# Patient Record
Sex: FEMALE | Race: WHITE | NOT HISPANIC OR LATINO | Employment: OTHER | URBAN - METROPOLITAN AREA
[De-identification: names, ages, dates, MRNs, and addresses within clinical notes are randomized per-mention and may not be internally consistent; named-entity substitution may affect disease eponyms.]

---

## 2020-02-25 LAB
EXT 24 HR UR METANEPHRINE: NORMAL
EXT 24 HR UR NORMETANEPHRINE: NORMAL
EXT 24 HR UR NORMETANEPHRINE: NORMAL
EXT 25 HYDROXY VIT D2: NORMAL
EXT 25 HYDROXY VIT D3: NORMAL
EXT 5 HIAA 24 HR URINE: NORMAL
EXT 5 HIAA BLOOD: NORMAL
EXT ACTH: NORMAL
EXT AFP: NORMAL
EXT ALBUMIN: 4.6 G/DL
EXT ALKALINE PHOSPHATASE: 62 U/L
EXT ALT: 16 U/L
EXT AMYLASE: NORMAL
EXT ANTI ISLET CELL AB: NORMAL
EXT ANTI PARIETAL CELL AB: NORMAL
EXT ANTI THYROID AB: NORMAL
EXT AST: 29 U/L
EXT BILIRUBIN DIRECT: NORMAL
EXT BILIRUBIN TOTAL: 0.4 MG/DL
EXT BK VIRUS DNA QN PCR: NORMAL
EXT BUN: 23 MG/DL
EXT C PEPTIDE: NORMAL
EXT CA 125: NORMAL
EXT CA 19-9: NORMAL
EXT CA 27-29: NORMAL
EXT CALCITONIN: NORMAL
EXT CALCIUM: 9.8 MG/DL
EXT CEA: NORMAL
EXT CHLORIDE: 103 MMOL/L
EXT CHOLESTEROL: NORMAL
EXT CHROMOGRANIN A: NORMAL
EXT CO2: 28 MMOL/L
EXT CREATININE UA: NORMAL
EXT CREATININE: 0.6 MG/DL
EXT CYCLOSPORONE LEVEL: NORMAL
EXT DOPAMINE: NORMAL
EXT EBV DNA BY PCR: NORMAL
EXT EPINEPHRINE: NORMAL
EXT FOLATE: NORMAL
EXT FREE T3: NORMAL
EXT FREE T4: NORMAL
EXT FSH: NORMAL
EXT GASTRIN RELEASING PEPTIDE: NORMAL
EXT GASTRIN RELEASING PEPTIDE: NORMAL
EXT GASTRIN: NORMAL
EXT GGT: NORMAL
EXT GHRELIN: NORMAL
EXT GLUCAGON: NORMAL
EXT GLUCOSE: 127 MG/DL
EXT GROWTH HORMONE: NORMAL
EXT HCV RNA QUANT PCR: NORMAL
EXT HDL: NORMAL
EXT HEMATOCRIT: 36 %
EXT HEMOGLOBIN A1C: NORMAL
EXT HEMOGLOBIN: 11.9 G/DL
EXT HISTAMINE 24 HR URINE: NORMAL
EXT HISTAMINE: NORMAL
EXT IGF-1: NORMAL
EXT IMMUNKNOW (NON-STIMULATED): NORMAL
EXT IMMUNKNOW (STIMULATED): NORMAL
EXT INR: NORMAL
EXT INSULIN: NORMAL
EXT LANREOTIDE LEVEL: NORMAL
EXT LDH, TOTAL: 378 U/L
EXT LDL CHOLESTEROL: NORMAL
EXT LIPASE: NORMAL
EXT MAGNESIUM: NORMAL
EXT METANEPHRINE FREE PLASMA: NORMAL
EXT MOTILIN: NORMAL
EXT NEUROKININ A CAMB: NORMAL
EXT NEUROKININ A ISI: NORMAL
EXT NEUROTENSIN: NORMAL
EXT NOREPINEPHRINE: NORMAL
EXT NORMETANEPHRINE: NORMAL
EXT NSE: NORMAL
EXT OCTREOTIDE LEVEL: NORMAL
EXT PANCREASTATIN CAMB: NORMAL
EXT PANCREASTATIN ISI: NORMAL
EXT PANCREATIC POLYPEPTIDE: NORMAL
EXT PHOSPHORUS: NORMAL
EXT PLATELETS: 279 X10^3/UL
EXT POTASSIUM: 3.8 MMOL/L
EXT PROGRAF LEVEL: NORMAL
EXT PROLACTIN: NORMAL
EXT PROTEIN TOTAL: 7.9 G/DL
EXT PROTEIN UA: NORMAL
EXT PT: NORMAL
EXT PTH, INTACT: NORMAL
EXT PTT: NORMAL
EXT RAPAMUNE LEVEL: NORMAL
EXT SEROTONIN: NORMAL
EXT SODIUM: 139 MMOL/L
EXT SOMATOSTATIN: NORMAL
EXT SUBSTANCE P: NORMAL
EXT TRIGLYCERIDES: NORMAL
EXT TRYPTASE: NORMAL
EXT TSH: NORMAL
EXT URIC ACID: NORMAL
EXT URINE AMYLASE U/HR: NORMAL
EXT URINE AMYLASE U/L: NORMAL
EXT VASOACTIVE INTESTINAL POLYPEPTIDE: NORMAL
EXT VITAMIN B12: NORMAL
EXT VMA 24 HR URINE: NORMAL
EXT WBC: 5 X10^3/UL
NEURON SPECIFIC ENOLASE: NORMAL

## 2020-03-05 ENCOUNTER — TELEPHONE (OUTPATIENT)
Dept: NEUROLOGY | Facility: HOSPITAL | Age: 45
End: 2020-03-05

## 2020-03-05 DIAGNOSIS — C7A.026 MALIGNANT CARCINOID TUMOR OF RECTUM: Primary | ICD-10-CM

## 2020-03-05 DIAGNOSIS — C7A.1 HIGH GRADE NEUROENDOCRINE CARCINOMA: Primary | ICD-10-CM

## 2020-03-05 NOTE — TELEPHONE ENCOUNTER
Spoke with patient about referral to [facility].  Gerardo made with Dr. Rehman.  No ga 68 dotatae scan needed prior to gerardo, rec FDG PET. per Dr. Rehman. She will bring her CT and MRI to clinic. All questions answered.

## 2020-03-05 NOTE — TELEPHONE ENCOUNTER
----- Message from Aggie Esparza RN sent at 3/5/2020  1:10 PM CST -----  She is getting neither... She is coming next week.  She had a ct and mri for restage post chemo/rads    ----- Message -----  From: oCle Rehman DO, FACP  Sent: 3/5/2020  10:46 AM CST  To: Aggie Esparza RN, Lyla Eid, RN    fdg  ----- Message -----  From: Lyla Eid RN  Sent: 3/5/2020  10:45 AM CST  To: Aggie Esparza RN, Cole Rehman DO, FACP    Ki67 90%, FDG or Ga68?    ----- Message -----  From: Aggie Esparza RN  Sent: 3/5/2020  10:39 AM CST  To: Cole Rehman DO, FACP, #    Do you want a ga scan?    ----- Message -----  From: Cole Rehman DO, FACP  Sent: 3/5/2020  10:35 AM CST  To: Aggie Esparza RN, Lyla Eid, MAVIS    That would be fine    ----- Message -----  From: Aggie Esparza RN  Sent: 3/5/2020  10:00 AM CST  To: Cole Rehman DO, FACP    Hi,  New NET ref with high grade rectal net.  Had chemo and was rescanned.  Looking for an santhosh..... Can you help?  3/11/20?? 11:30?

## 2020-03-05 NOTE — TELEPHONE ENCOUNTER
sched fdg pet scan the day prior to md santhosh. Patient instructed.  She verbalized understanding.

## 2020-03-05 NOTE — TELEPHONE ENCOUNTER
----- Message from Renetta Jaramillo RN sent at 3/4/2020  4:54 PM CST -----  This is an external referral with metastatic neuroendocrine tumor originating in anal canal.    See media tab for information.    Please call and schedule the patient.    Thank you,    Jocelin

## 2020-03-05 NOTE — TELEPHONE ENCOUNTER
----- Message from Ela Araujo sent at 3/5/2020  1:08 PM CST -----  Contact: SELF [phone]  RR - Patient is calling to inquire how long they will have to be here when they come for appointment. Please call

## 2020-03-05 NOTE — TELEPHONE ENCOUNTER
New NET referral from Dr. Eb Riggs in [place], [place].  Patient with neuroendocrine carcinoma arising in the anal canal, diagnosed 8/2019. Pathology revealed a high grade neuroendocrine carcinoma in the soft tissue as well as the anorectal area, Ki 67 > 90%. She received carbo/etoposide - 6 cycles and radiation therapy.

## 2020-03-09 ENCOUNTER — TELEPHONE (OUTPATIENT)
Dept: NEUROLOGY | Facility: HOSPITAL | Age: 45
End: 2020-03-09

## 2020-03-11 ENCOUNTER — OFFICE VISIT (OUTPATIENT)
Dept: NEUROLOGY | Facility: HOSPITAL | Age: 45
End: 2020-03-11
Attending: INTERNAL MEDICINE
Payer: COMMERCIAL

## 2020-03-11 VITALS
HEIGHT: 64 IN | BODY MASS INDEX: 17.93 KG/M2 | WEIGHT: 105.06 LBS | OXYGEN SATURATION: 100 % | DIASTOLIC BLOOD PRESSURE: 81 MMHG | SYSTOLIC BLOOD PRESSURE: 109 MMHG | HEART RATE: 93 BPM

## 2020-03-11 DIAGNOSIS — C7B.8 SECONDARY NEUROENDOCRINE TUMOR OF LIVER: ICD-10-CM

## 2020-03-11 DIAGNOSIS — C7A.1 MALIGNANT POORLY DIFFERENTIATED NEUROENDOCRINE CARCINOMA: ICD-10-CM

## 2020-03-11 PROCEDURE — 99214 OFFICE O/P EST MOD 30 MIN: CPT | Performed by: INTERNAL MEDICINE

## 2020-03-11 PROCEDURE — 99205 PR OFFICE/OUTPT VISIT, NEW, LEVL V, 60-74 MIN: ICD-10-PCS | Mod: ,,, | Performed by: INTERNAL MEDICINE

## 2020-03-11 PROCEDURE — 3008F PR BODY MASS INDEX (BMI) DOCUMENTED: ICD-10-PCS | Mod: CPTII,,, | Performed by: INTERNAL MEDICINE

## 2020-03-11 PROCEDURE — 3008F BODY MASS INDEX DOCD: CPT | Mod: CPTII,,, | Performed by: INTERNAL MEDICINE

## 2020-03-11 PROCEDURE — 99205 OFFICE O/P NEW HI 60 MIN: CPT | Mod: ,,, | Performed by: INTERNAL MEDICINE

## 2020-03-11 RX ORDER — FENTANYL 25 UG/1
PATCH TRANSDERMAL
COMMUNITY
Start: 2020-02-21

## 2020-03-11 RX ORDER — CYCLOBENZAPRINE HCL 10 MG
TABLET ORAL
COMMUNITY

## 2020-03-11 RX ORDER — POTASSIUM CHLORIDE 20 MEQ/1
TABLET, EXTENDED RELEASE ORAL
COMMUNITY

## 2020-03-11 RX ORDER — VIT C/E/ZN/COPPR/LUTEIN/ZEAXAN 250MG-90MG
CAPSULE ORAL
COMMUNITY

## 2020-03-11 RX ORDER — ONDANSETRON 8 MG/1
TABLET, ORALLY DISINTEGRATING ORAL
COMMUNITY
Start: 2020-01-28

## 2020-03-11 RX ORDER — FENTANYL 50 UG/1
PATCH TRANSDERMAL
COMMUNITY

## 2020-03-11 RX ORDER — ALBUTEROL SULFATE 90 UG/1
AEROSOL, METERED RESPIRATORY (INHALATION)
COMMUNITY

## 2020-03-11 RX ORDER — POTASSIUM CHLORIDE 20 MEQ/1
TABLET, EXTENDED RELEASE ORAL
COMMUNITY
Start: 2020-02-20

## 2020-03-11 RX ORDER — OXYCODONE HYDROCHLORIDE 10 MG/1
TABLET ORAL
COMMUNITY
Start: 2020-02-21

## 2020-03-11 NOTE — PROGRESS NOTES
[facility]OLAN[facility]:  [facility]  A collaboration between [facility] and [facility]    PATIENT: Sasha Bales  MRN: [id number]  DATE: 3/11/2020      Diagnosis:   1. Malignant poorly differentiated neuroendocrine carcinoma    2. Secondary neuroendocrine tumor of liver        Chief Complaint: Consult      Oncologic History:      Oncologic History Neuroendocrine carcinoma of the anus diagnosed 07/2019  Metastatic disease to liver at presentation      Oncologic Treatment Palliative pelvic radiation 30 Gy 08/2019  Carboplatin/etoposide 08/2019 - 1/2020 (6 cycles)      Pathology   07/2019:  Anorectal mass-Poorly differentiated squamous cell carcinoma  08/2019:  Anorectal mass-Poorly differentiated High-grade neuroendocrine carcinoma, Ki-67 90%          Subjective:    Interval History: Ms. Bales is a 45 y.o. female who is seen as an initial visit for a neuroendocrine carcinoma the anus.  Her history dates to spring of 2019 when she started to develop rectal pressure and pain and had noted a mass.  She had initially thought that this was uterine prolapse as she was told that she was at high risk for this.  She eventually sought medical attention and a biopsy was performed in 07/2019 with pathology showing a poorly differentiated squamous cell carcinoma.  A CT scan was performed showing metastatic disease to the liver. A Re-read was performed on the initial biopsy, this time showing a poorly differentiated, high-grade neuroendocrine carcinoma with Ki-67 of 90%.  She completed palliative pelvic radiation in 08/2019 and then went on to systemic chemotherapy using carboplatin and etoposide and completed 6 cycles in 01/2020.  Repeat imaging had revealed overall stability of her disease.    She is seen today with her .  She states that her pain has been well controlled and she has been trying to wean down on opioid analgesia.  She is  active and her weight has been stable.  She denies any rectal bleeding. She has no other new complaints.    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    high grade, Ki 67 > 90%    Hx antineoplastic chemotherapy 08/20/2019    Carbo/Etoposide    Hx of radiation therapy 08/05/2019    30 Gy completed 8/16/19    Malignant neuroendocrine tumor of lymph node     Malignant poorly differentiated neuroendocrine carcinoma 3/11/2020    Secondary malignant neuroendocrine tumor of liver     Secondary neuroendocrine tumor of liver 3/11/2020       Past Surgical HIstory:   Past Surgical History:   Procedure Laterality Date    AUGMENTATION OF BREAST Bilateral     BREAST BIOPSY Right     dermoid cyst      back       Family History:   Family History   Problem Relation Age of Onset    Cancer Father         pancreatic     Cancer Maternal Grandmother         ? lung    Cancer Maternal Grandfather        Social History:  reports that she has been smoking. She has a 33.00 pack-year smoking history. She uses smokeless tobacco. She reports that she drinks alcohol. She reports that she has current or past drug history. Drug: Marijuana.    Allergies:  Review of patient's allergies indicates:  No Known Allergies    Medications:  Current Outpatient Medications   Medication Sig Dispense Refill    albuterol (PROAIR HFA) 90 mcg/actuation inhaler ProAir HFA 90 mcg/actuation aerosol inhaler      cholecalciferol, vitamin D3, (VITAMIN D3) 25 mcg (1,000 unit) capsule Vitamin D3 25 mcg (1,000 unit) capsule   Take 1 capsule every day by oral route.      cyclobenzaprine (FLEXERIL) 10 MG tablet Flexeril 10 mg tablet   Take 1 tablet 3 times a day by oral route as needed.      fentaNYL (DURAGESIC) 25 mcg/hr       fentaNYL (DURAGESIC) 50 mcg/hr fentanyl 50 mcg/hr transdermal patch      multivitamin capsule Take 1 capsule by mouth once daily.      ondansetron (ZOFRAN-ODT) 8 MG  "TbDL       oxyCODONE (ROXICODONE) 10 mg Tab immediate release tablet       potassium chloride SA (K-DUR,KLOR-CON) 20 MEQ tablet potassium chloride ER 20 mEq tablet,extended release(part/cryst)      potassium chloride SA (K-DUR,KLOR-CON) 20 MEQ tablet        No current facility-administered medications for this visit.        Review of Systems   Constitutional: Negative for chills, fever and unexpected weight change.   HENT: Negative for congestion, hearing loss and nosebleeds.    Eyes: Negative for visual disturbance.   Respiratory: Negative for cough and shortness of breath.    Cardiovascular: Negative for chest pain and palpitations.   Gastrointestinal: Negative for abdominal pain, blood in stool, constipation, diarrhea, nausea and vomiting.   Genitourinary: Negative for dysuria.   Musculoskeletal: Negative for back pain and gait problem.   Skin: Negative for color change and rash.   Neurological: Negative for dizziness, weakness and headaches.   Hematological: Negative for adenopathy. Does not bruise/bleed easily.   Psychiatric/Behavioral: Negative for confusion.       ECOG Performance Status: 0   Objective:      Vitals:   Vitals:    03/11/20 1103   BP: 109/81   BP Location: Right arm   Patient Position: Sitting   BP Method: Medium (Automatic)   Pulse: 93   SpO2: 100%   Weight: 47.7 kg (105 lb 0.8 oz)   Height: 5' 4" (1.626 m)     BMI: Body mass index is 18.03 kg/m².    Physical Exam   Constitutional: She is oriented to person, place, and time. She appears well-developed and well-nourished. No distress.   HENT:   Head: Normocephalic.   Mouth/Throat: No oropharyngeal exudate.   Eyes: EOM are normal. No scleral icterus.   Neck: Neck supple. No tracheal deviation present. No thyromegaly present.   Cardiovascular: Normal rate and regular rhythm.   Pulmonary/Chest: Effort normal and breath sounds normal. No respiratory distress. She has no wheezes. She has no rales.   Abdominal: Soft. She exhibits no distension and " no mass. There is no tenderness. There is no rebound and no guarding.   Musculoskeletal: Normal range of motion. She exhibits no edema.   Lymphadenopathy:     She has no cervical adenopathy.   Neurological: She is alert and oriented to person, place, and time. No cranial nerve deficit.   Skin: Skin is warm and dry.   Psychiatric: She has a normal mood and affect.       Laboratory Data:  Lab Visit on 03/11/2020   Component Date Value Ref Range Status    Miscellaneous Test Name 03/11/2020 See BELOW   Final    guardant test (DO NOT SENT OUT) call [facility] at [phone] to     WBC 03/11/2020 5.94  3.90 - 12.70 K/uL Final    RBC 03/11/2020 3.26* 4.00 - 5.40 M/uL Final    Hemoglobin 03/11/2020 11.7* 12.0 - 16.0 g/dL Final    Hematocrit 03/11/2020 35.4* 37.0 - 48.5 % Final    Mean Corpuscular Volume 03/11/2020 109* 82 - 98 fL Final    Mean Corpuscular Hemoglobin 03/11/2020 35.9* 27.0 - 31.0 pg Final    Mean Corpuscular Hemoglobin Conc 03/11/2020 33.1  32.0 - 36.0 g/dL Final    RDW 03/11/2020 12.4  11.5 - 14.5 % Final    Platelets 03/11/2020 230  150 - 350 K/uL Final    MPV 03/11/2020 9.4  9.2 - 12.9 fL Final    Gran # (ANC) 03/11/2020 3.8  1.8 - 7.7 K/uL Final    Comment: The ANC is based on a white cell differential from an   automated cell counter. It has not been microscopically   reviewed for the presence of abnormal cells. Clinical   correlation is required.      Immature Grans (Abs) 03/11/2020 0.01  0.00 - 0.04 K/uL Final    Comment: Mild elevation in immature granulocytes is non specific and   can be seen in a variety of conditions including stress response,   acute inflammation, trauma and pregnancy. Correlation with other   laboratory and clinical findings is essential.     Abstract on 03/05/2020   Component Date Value Ref Range Status    EXT WBC 02/25/2020 5.0  x10^3/ul Final    EXT Hemoglobin 02/25/2020 11.9  g/dl Final    EXT Hematocrit 02/25/2020 36.0  % Final    EXT Platelets 02/25/2020  279  x10^3/ul Final    EXT Glucose 02/25/2020 127  mg/dl Final    EXT BUN 02/25/2020 23  mg/dl Final    EXT Creatinine 02/25/2020 0.6  mg/dL Final    EXT Sodium 02/25/2020 139  mmol/L Final    EXT Potassium 02/25/2020 3.8  mmol/l Final    EXT Chloride 02/25/2020 103  mmol/l Final    EXT CO2 02/25/2020 28  mmol/l Final    EXT Calcium 02/25/2020 9.8  mg/dl Final    EXT Protein total 02/25/2020 7.9  g/dl Final    EXT Albumin 02/25/2020 4.6  g/dl Final    EXT BilirubiN Total 02/25/2020 0.40  mg/dl Final    EXT Alkaline Phosphatase 02/25/2020 62  u/l Final    EXT AST 02/25/2020 29  u/l Final    EXT ALT 02/25/2020 16  u/l Final    EXT LDH, Total 02/25/2020 378  u/l Final       Imaging:   Outside images reviewed.         Assessment:       1. Malignant poorly differentiated neuroendocrine carcinoma    2. Secondary neuroendocrine tumor of liver           Plan:     I have reviewed the findings of her most recent CT and MRI with her and her  today and also have reviewed both pathology reports.  Her initial pathology report had indicated a squamous cell carcinoma and upon further evaluation this was determined to be a poorly differentiated neuroendocrine carcinoma.  She has been treated for her neuroendocrine carcinoma with systemic chemotherapy and has maintained stability of her disease based on her most recent scans in 01/2020.  Functionally, she is doing well and she is seen for possible evaluation liver directed therapy as she does demonstrate several sites within her liver.  At this point, I would recommend that we consider repeating a biopsy to really determine her true histology.  It would be exceedingly rare to have a neuroendocrine carcinoma of the anus and I would have expected her to have more of a response with platinum-based chemotherapy than she had.  She is in favor of repeating a biopsy and I will also have her original pathology reviewed at our institution.  I have sent her for a cell  free DNA analysis which may also help determine treatment options should she have a mutation we can target.  Finally, we will discuss her neuroendocrine tumor board to determine the role of liver directed therapy.  Currently we normally reserved liver directed therapy to the lower grade neuroendocrine tumors and I am not sure in someone with a metastatic, poorly differentiated neuroendocrine carcinoma that liver directed therapy would be beneficial.  After tumor Board I have told her we would contact her with further recommendations.  All questions were answered and she is agreeable with this plan.      Cole Rehman DO, [facility]  Hematology & Oncology, [facility]  [address]  [place]  [address]  ph. [phone]; [phone]  fax. [phone]      60 minutes were spent in coordination of patient's care, record review and counseling.  More than 50% of the time was face-to-face.

## 2020-03-11 NOTE — PATIENT INSTRUCTIONS
Have labs today, leave the box with lab AFTER they draw the blood     IR will call you to scheduled liver biopsy    We will speak about you in tumor board after your liver biopsy.

## 2020-03-11 NOTE — LETTER
March 11, 2020        Nikki Riggs MD  [address] [address]             [facility] [address] [place] [address]  Phone: [phone]  Fax: [phone]   Patient: Sasha Bales   MR Number: [id number]   YOB: 1975   Date of Visit: 3/11/2020       Dear Dr. Riggs:    Thank you for referring Sasha Bales to me for evaluation. Attached you will find relevant portions of my assessment and plan of care.    If you have questions, please do not hesitate to call me. I look forward to following Sasha Bales along with you.    Sincerely,      Cole Rehman DO, FACP            CC  No Recipients    Enclosure

## 2020-03-19 ENCOUNTER — LAB VISIT (OUTPATIENT)
Dept: LAB | Facility: HOSPITAL | Age: 45
End: 2020-03-19
Attending: INTERNAL MEDICINE
Payer: COMMERCIAL

## 2020-03-19 DIAGNOSIS — C7A.026 MALIGNANT CARCINOID TUMOR OF RECTUM: ICD-10-CM

## 2020-03-19 DIAGNOSIS — C7A.1 MALIGNANT POORLY DIFFERENTIATED NEUROENDOCRINE CARCINOMA: ICD-10-CM

## 2020-03-19 DIAGNOSIS — C7B.8 SECONDARY NEUROENDOCRINE TUMOR OF LIVER: ICD-10-CM

## 2020-03-19 PROCEDURE — 88360 TUMOR IMMUNOHISTOCHEM/MANUAL: CPT | Mod: 59 | Performed by: PATHOLOGY

## 2020-03-19 PROCEDURE — 88342 CHG IMMUNOCYTOCHEMISTRY: ICD-10-PCS | Mod: 26,59,, | Performed by: PATHOLOGY

## 2020-03-19 PROCEDURE — 88323 CONSLTJ&REPRT MATRL PREP SLD: CPT | Mod: 26,,, | Performed by: PATHOLOGY

## 2020-03-19 PROCEDURE — 88342 IMHCHEM/IMCYTCHM 1ST ANTB: CPT | Mod: 59 | Performed by: PATHOLOGY

## 2020-03-19 PROCEDURE — 88342 IMHCHEM/IMCYTCHM 1ST ANTB: CPT | Mod: 26,59,, | Performed by: PATHOLOGY

## 2020-03-19 PROCEDURE — 88323 PR  MICROSLIDE CONSULT W SLIDE PREP: ICD-10-PCS | Mod: 26,,, | Performed by: PATHOLOGY

## 2020-03-19 PROCEDURE — 88321 CONSLTJ&REPRT SLD PREP ELSWR: CPT | Performed by: PATHOLOGY

## 2020-03-19 PROCEDURE — 88341 IMHCHEM/IMCYTCHM EA ADD ANTB: CPT | Performed by: PATHOLOGY

## 2020-03-19 PROCEDURE — 88323 CONSLTJ&REPRT MATRL PREP SLD: CPT | Performed by: PATHOLOGY

## 2020-04-03 ENCOUNTER — TELEPHONE (OUTPATIENT)
Dept: NEUROLOGY | Facility: HOSPITAL | Age: 45
End: 2020-04-03

## 2020-04-03 NOTE — TELEPHONE ENCOUNTER
Patient asked if she should have biopsy done closer to home. I told her this was recommended.  She stated one was scheduled. I told her when it is completed, to please send report to us, we would have it re-read at our institution.

## 2020-04-03 NOTE — TELEPHONE ENCOUNTER
----- Message from Cindy Jones sent at 3/31/2020 11:12 AM CDT -----  Contact: Pt   Pt is requesting a call back regarding biopsy   Pt would like to know if this was still in the plan of care   Pt would also like to know if she would be able to have biopsy completed closer to pt     Pt can be reached at [phone]

## 2020-04-21 ENCOUNTER — TELEPHONE (OUTPATIENT)
Dept: NEUROLOGY | Facility: HOSPITAL | Age: 45
End: 2020-04-21

## 2020-04-21 NOTE — TELEPHONE ENCOUNTER
Spoke to patient, let her know that we would be requesting a CT and MRI now to compare to previous. She stated she had the liver biopsy done.  Requested results from [facility].

## 2020-04-21 NOTE — TELEPHONE ENCOUNTER
----- Message from Ela Araujo sent at 4/21/2020  1:07 PM CDT -----  Contact: self [phone]  RR - Patient is returning your call. Please call

## 2020-04-23 ENCOUNTER — TELEPHONE (OUTPATIENT)
Dept: NEUROLOGY | Facility: HOSPITAL | Age: 45
End: 2020-04-23

## 2020-04-24 ENCOUNTER — TELEPHONE (OUTPATIENT)
Dept: NEUROLOGY | Facility: HOSPITAL | Age: 45
End: 2020-04-24

## 2020-04-27 ENCOUNTER — TELEPHONE (OUTPATIENT)
Dept: NEUROLOGY | Facility: HOSPITAL | Age: 45
End: 2020-04-27

## 2020-04-27 NOTE — TELEPHONE ENCOUNTER
----- Message from Ela Araujo sent at 4/27/2020 11:10 AM CDT -----  Contact: Leilani wang/ [facility] Scheduling [phone]  RR - Leilani said they can not schedule patient for MRI and CT scan because doctor has to have license in their state. Please call

## 2020-04-28 ENCOUNTER — TELEPHONE (OUTPATIENT)
Dept: NEUROLOGY | Facility: HOSPITAL | Age: 45
End: 2020-04-28

## 2020-05-04 ENCOUNTER — TELEPHONE (OUTPATIENT)
Dept: NEUROLOGY | Facility: HOSPITAL | Age: 45
End: 2020-05-04